# Patient Record
Sex: MALE | Race: WHITE | NOT HISPANIC OR LATINO | Employment: FULL TIME | URBAN - METROPOLITAN AREA
[De-identification: names, ages, dates, MRNs, and addresses within clinical notes are randomized per-mention and may not be internally consistent; named-entity substitution may affect disease eponyms.]

---

## 2017-08-09 ENCOUNTER — OFFICE VISIT (OUTPATIENT)
Dept: URGENT CARE | Facility: CLINIC | Age: 52
End: 2017-08-09
Payer: COMMERCIAL

## 2017-08-09 VITALS
HEART RATE: 78 BPM | DIASTOLIC BLOOD PRESSURE: 85 MMHG | HEIGHT: 77 IN | SYSTOLIC BLOOD PRESSURE: 140 MMHG | RESPIRATION RATE: 20 BRPM | BODY MASS INDEX: 26.8 KG/M2 | TEMPERATURE: 98 F | WEIGHT: 227 LBS | OXYGEN SATURATION: 96 %

## 2017-08-09 DIAGNOSIS — L30.9 FACIAL DERMATITIS: ICD-10-CM

## 2017-08-09 DIAGNOSIS — B37.2 CANDIDAL INTERTRIGO: Primary | ICD-10-CM

## 2017-08-09 PROCEDURE — 99204 OFFICE O/P NEW MOD 45 MIN: CPT | Performed by: PHYSICIAN ASSISTANT

## 2017-08-09 RX ORDER — CLOTRIMAZOLE 1 %
CREAM (GRAM) TOPICAL
Qty: 2 TUBE | Refills: 2 | Status: SHIPPED | OUTPATIENT
Start: 2017-08-09

## 2017-08-09 RX ORDER — IBUPROFEN 200 MG
200 TABLET ORAL EVERY 6 HOURS PRN
COMMUNITY

## 2017-08-09 RX ORDER — METHYLPREDNISOLONE 4 MG/1
TABLET ORAL
Qty: 21 TAB | Refills: 0 | Status: SHIPPED | OUTPATIENT
Start: 2017-08-09

## 2017-08-09 RX ORDER — OMEPRAZOLE 20 MG/1
20 CAPSULE, DELAYED RELEASE ORAL DAILY
COMMUNITY

## 2017-08-09 RX ORDER — TADALAFIL 2.5 MG/1
TABLET ORAL
COMMUNITY

## 2017-08-09 NOTE — MR AVS SNAPSHOT
"        Onel Thomas   2017 5:30 PM   Office Visit   MRN: [id number]    Department:  [facility]   Dept Phone:  [phone]    Description:  Male : 1965   Provider:  Henry Yang PA-C           Reason for Visit     Rash Couple days face itching .      Allergies as of 2017     No Known Allergies      You were diagnosed with     Candidal intertrigo   [270867]  -  Primary     Facial dermatitis   [834211]         Vital Signs     Blood Pressure Pulse Temperature Respirations Height Weight    140/85 mmHg 78 36.7 °C (98 °F) 20 1.956 m (6' 5.01\") 102.967 kg (227 lb)    Body Mass Index Oxygen Saturation Smoking Status             26.91 kg/m2 96% Never Smoker          Basic Information     Date Of Birth Sex Race Ethnicity Preferred Language    1965 Male White Non- English      Health Maintenance     Patient has no pending health maintenance at this time      Current Immunizations     No immunizations on file.      Below and/or attached are the medications your provider expects you to take. Review all of your home medications and newly ordered medications with your provider and/or pharmacist. Follow medication instructions as directed by your provider and/or pharmacist. Please keep your medication list with you and share with your provider. Update the information when medications are discontinued, doses are changed, or new medications (including over-the-counter products) are added; and carry medication information at all times in the event of emergency situations     Allergies:  No Known Allergies          Medications  Valid as of: 2017 -  5:50 PM    Generic Name Brand Name Tablet Size Instructions for use    Clotrimazole (Cream) LOTRIMIN 1 % Apply to affected area BID x 14 days        Ibuprofen (Tab) MOTRIN 200 MG Take 200 mg by mouth every 6 hours as needed.        Loratadine-Pseudoephedrine (TABLET SR 12 HR) CLARITIN D 5-120 MG Take 1 Tab by mouth 2 times a day.       " MethylPREDNISolone (Tablet Therapy Pack) MEDROL DOSEPAK 4 MG Use as directed        Omeprazole (CAPSULE DELAYED RELEASE) PRILOSEC 20 MG Take 20 mg by mouth every day.        Tadalafil (Tab) Tadalafil 2.5 MG Take  by mouth.        .                 Medicines prescribed today were sent to:     [id number] - [place], [place] - [address]    [address] [place] [address]    Phone: [phone] Fax: [phone]    Open 24 Hours?: No      Medication refill instructions:       If your prescription bottle indicates you have medication refills left, it is not necessary to call your provider’s office. Please contact your pharmacy and they will refill your medication.    If your prescription bottle indicates you do not have any refills left, you may request refills at any time through one of the following ways: The online [employer] system (except Urgent Care), by calling your provider’s office, or by asking your pharmacy to contact your provider’s office with a refill request. Medication refills are processed only during regular business hours and may not be available until the next business day. Your provider may request additional information or to have a follow-up visit with you prior to refilling your medication.   *Please Note: Medication refills are assigned a new Rx number when refilled electronically. Your pharmacy may indicate that no refills were authorized even though a new prescription for the same medication is available at the pharmacy. Please request the medicine by name with the pharmacy before contacting your provider for a refill.        Instructions    Intertrigo  Intertrigo is a skin condition that occurs in between folds of skin in places on the body that rub together a lot and do not get much ventilation. It is caused by heat, moisture, friction, sweat retention, and lack of air circulation, which produces red, irritated patches and, sometimes, scaling or drainage. People who have diabetes,  who are obese, or who have treatment with antibiotics are at increased risk for intertrigo.  The most common sites for intertrigo to occur include:  · The groin.  · The breasts.  · The armpits.  · Folds of abdominal skin.  · Webbed spaces between the fingers or toes.  Intertrigo may be aggravated by:  · Sweat.  · Feces.  · Yeast or bacteria that are present near skin folds.  · Urine.  · Vaginal discharge.  HOME CARE INSTRUCTIONS  · The following steps can be taken to reduce friction and keep the affected area cool and dry:  ¨ Expose skin folds to the air.  ¨ Keep deep skin folds  with cotton or linen cloth. Avoid tight fitting clothing that could cause chafing.  ¨ Wear open-toed shoes or sandals to help reduce moisture between the toes.  ¨ Apply absorbent powders to affected areas as directed by your caregiver.  ¨ Apply over-the-counter barrier pastes, such as zinc oxide, as directed by your caregiver.  · If you develop a fungal infection in the affected area, your caregiver may have you use antifungal creams.  SEEK MEDICAL CARE IF:   · The rash is not improving after 1 week of treatment.  · The rash is getting worse (more red, more swollen, more painful, or spreading).  · You have a fever or chills.  MAKE SURE YOU:   · Understand these instructions.  · Will watch your condition.  · Will get help right away if you are not doing well or get worse.     This information is not intended to replace advice given to you by your health care provider. Make sure you discuss any questions you have with your health care provider.     Document Released: 12/18/2006 Document Revised: 03/11/2013 Document Reviewed: 06/02/2011  [employer] Interactive Patient Education ©2016 Elsevier Inc.            [employer] Access Code: [email or web address]  Expires: 9/8/2017  5:50 PM    Your email address is not on file at [facility].  Email Addresses are required for you to sign up for [employer], please contact [phone] to verify your  personal information and to provide your email address prior to attempting to register for [employer].    Onel Thomas  [address] [place] [id number]    [employer]  A secure, online tool to manage your health information     [employer]’s [employer]® is a secure, online tool that connects you to your personalized health information from the privacy of your home -- day or night - making it very easy for you to manage your healthcare. Once the activation process is completed, you can even access your medical information using the [employer] rachel, which is available for free in the Apple Rachel store or Google Play store.     To learn more about [employer], visit [email or web address]    There are two levels of access available (as shown below):   My Chart Features  [facility] Primary Care Doctor [facility]  Specialists [facility]  Urgent  Care Non-[facility] Primary Care Doctor   Email your healthcare team securely and privately 24/7 X X X    Manage appointments: schedule your next appointment; view details of past/upcoming appointments X      Request prescription refills. X      View recent personal medical records, including lab and immunizations X X X X   View health record, including health history, allergies, medications X X X X   Read reports about your outpatient visits, procedures, consult and ER notes X X X X   See your discharge summary, which is a recap of your hospital and/or ER visit that includes your diagnosis, lab results, and care plan X X  X     How to register for [employer]:  Once your e-mail address has been verified, follow the following steps to sign up for [employer].     1. Go to  [email or web address]  2. Click on the Sign Up Now box, which takes you to the New Member Sign Up page. You will need to provide the following information:  a. Enter your [employer] Access Code exactly as it appears at the top of this page. (You will not need to use this code after you’ve completed the sign-up process. If you do not sign up  before the expiration date, you must request a new code.)   b. Enter your date of birth.   c. Enter your home email address.   d. Click Submit, and follow the next screen’s instructions.  3. Create a [employer] ID. This will be your [employer] login ID and cannot be changed, so think of one that is secure and easy to remember.  4. Create a [employer] password. You can change your password at any time.  5. Enter your Password Reset Question and Answer. This can be used at a later time if you forget your password.   6. Enter your e-mail address. This allows you to receive e-mail notifications when new information is available in [employer].  7. Click Sign Up. You can now view your health information.    For assistance activating your [employer] account, call [phone]

## 2017-08-10 NOTE — PROGRESS NOTES
Subjective:      Pt is a 52 y.o. male who presents with Rash            Rash  This is a new problem. The current episode started in the past 7 days. The problem has been gradually worsening since onset. The affected locations include the groin, face and left axilla. The rash is characterized by redness and itchiness. It is unknown if there was an exposure to a precipitant. Past treatments include anti-itch cream. The treatment provided no relief.   Pt states that he is from [place], out here to clean out his recent mother in law's home after she passed away. He notes a hoarder like situation with dust and dirt and grime and notes irritant rash on face with fungal like rash under left axilla and on groin. Pt has not taken any Rx medications for this condition. Pt states the pain is a 3/10 from itching, aching in nature and worse at night. Pt denies CP, SOB, NVD, paresthesias, headaches, dizziness, change in vision, hives, or other joint pain. The pt's medication list, problem list, and allergies have been evaluated and reviewed during today's visit.    PMH:  History reviewed. No pertinent past medical history.    PSH:  History reviewed. No pertinent past surgical history.    Fam Hx:  the patient's family history is not pertinent to their current complaint        Soc HX:  Social History     Social History   • Marital Status:      Spouse Name: N/A   • Number of Children: N/A   • Years of Education: N/A     Occupational History   • Not on file.     Social History Main Topics   • Smoking status: Never Smoker    • Smokeless tobacco: Never Used   • Alcohol Use: Not on file   • Drug Use: Not on file   • Sexual Activity: Not on file     Other Topics Concern   • Not on file     Social History Narrative   • No narrative on file         Medications:    Current outpatient prescriptions:   •  loratadine/pseudo SR (CLARITIN D) 5-120 MG TABLET SR 12 HR, Take 1 Tab by mouth 2 times a day., Disp: , Rfl:   •  Tadalafil  "(CIALIS) 2.5 MG Tab, Take  by mouth., Disp: , Rfl:   •  omeprazole (PRILOSEC) 20 MG delayed-release capsule, Take 20 mg by mouth every day., Disp: , Rfl:   •  ibuprofen (MOTRIN) 200 MG Tab, Take 200 mg by mouth every 6 hours as needed., Disp: , Rfl:   •  clotrimazole (LOTRIMIN) 1 % Cream, Apply to affected area BID x 14 days, Disp: 2 Tube, Rfl: 2  •  MethylPREDNISolone (MEDROL DOSEPAK) 4 MG Tablet Therapy Pack, Use as directed, Disp: 21 Tab, Rfl: 0      Allergies:  Review of patient's allergies indicates no known allergies.        Review of Systems   Skin: Positive for rash.   Constitutional: Negative for fever, chills and malaise/fatigue.   HENT: Negative for congestion and sore throat.    Eyes: Negative for blurred vision, double vision and photophobia.   Respiratory: Negative for cough and shortness of breath.    Cardiovascular: Negative for chest pain and palpitations.   Gastrointestinal: Negative for heartburn, nausea, vomiting, abdominal pain, diarrhea and constipation.   Genitourinary: Negative for dysuria and flank pain.   Musculoskeletal: Negative for joint pain and myalgias.   Neurological: Negative for dizziness, tingling and headaches.   Endo/Heme/Allergies: Does not bruise/bleed easily.   Psychiatric/Behavioral: Negative for depression. The patient is not nervous/anxious.             Objective:     /85 mmHg  Pulse 78  Temp(Src) 36.7 °C (98 °F)  Resp 20  Ht 1.956 m (6' 5.01\")  Wt 102.967 kg (227 lb)  BMI 26.91 kg/m2  SpO2 96%     Physical Exam   Skin: Skin is warm. Rash noted. No abrasion, no bruising, no burn, no ecchymosis, no laceration, no lesion and no petechiae noted. Rash is maculopapular. He is not diaphoretic. No cyanosis or erythema. No pallor. Nails show no clubbing.               Constitutional: PT is oriented to person, place, and time. PT appears well-developed and well-nourished. No distress.   HENT:   Head: Normocephalic and atraumatic.   Mouth/Throat: Oropharynx is clear and " moist. No oropharyngeal exudate.   Eyes: Conjunctivae normal and EOM are normal. Pupils are equal, round, and reactive to light.   Neck: Normal range of motion. Neck supple. No thyromegaly present.   Cardiovascular: Normal rate, regular rhythm, normal heart sounds and intact distal pulses.  Exam reveals no gallop and no friction rub.    No murmur heard.  Pulmonary/Chest: Effort normal and breath sounds normal. No respiratory distress. PT has no wheezes. PT has no rales. Pt exhibits no tenderness.   Abdominal: Soft. Bowel sounds are normal. PT exhibits no distension and no mass. There is no tenderness. There is no rebound and no guarding.   Musculoskeletal: Normal range of motion. PT exhibits no edema and no tenderness.   Neurological: PT is alert and oriented to person, place, and time. PT has normal reflexes. No cranial nerve deficit.       Psychiatric: PT has a normal mood and affect. PT behavior is normal. Judgment and thought content normal.        Assessment/Plan:     1. Candidal intertrigo    - clotrimazole (LOTRIMIN) 1 % Cream; Apply to affected area BID x 14 days  Dispense: 2 Tube; Refill: 2    2. Facial dermatitis    - MethylPREDNISolone (MEDROL DOSEPAK) 4 MG Tablet Therapy Pack; Use as directed  Dispense: 21 Tab; Refill: 0    Avoidance of triggers discussed  Rest, fluids encouraged.  AVS with medical info given.  Pt was in full understanding and agreement with the plan.  Follow-up as needed if symptoms worsen or fail to improve.

## 2017-08-10 NOTE — PATIENT INSTRUCTIONS
Intertrigo  Intertrigo is a skin condition that occurs in between folds of skin in places on the body that rub together a lot and do not get much ventilation. It is caused by heat, moisture, friction, sweat retention, and lack of air circulation, which produces red, irritated patches and, sometimes, scaling or drainage. People who have diabetes, who are obese, or who have treatment with antibiotics are at increased risk for intertrigo.  The most common sites for intertrigo to occur include:  · The groin.  · The breasts.  · The armpits.  · Folds of abdominal skin.  · Webbed spaces between the fingers or toes.  Intertrigo may be aggravated by:  · Sweat.  · Feces.  · Yeast or bacteria that are present near skin folds.  · Urine.  · Vaginal discharge.  HOME CARE INSTRUCTIONS  · The following steps can be taken to reduce friction and keep the affected area cool and dry:  ¨ Expose skin folds to the air.  ¨ Keep deep skin folds  with cotton or linen cloth. Avoid tight fitting clothing that could cause chafing.  ¨ Wear open-toed shoes or sandals to help reduce moisture between the toes.  ¨ Apply absorbent powders to affected areas as directed by your caregiver.  ¨ Apply over-the-counter barrier pastes, such as zinc oxide, as directed by your caregiver.  · If you develop a fungal infection in the affected area, your caregiver may have you use antifungal creams.  SEEK MEDICAL CARE IF:   · The rash is not improving after 1 week of treatment.  · The rash is getting worse (more red, more swollen, more painful, or spreading).  · You have a fever or chills.  MAKE SURE YOU:   · Understand these instructions.  · Will watch your condition.  · Will get help right away if you are not doing well or get worse.     This information is not intended to replace advice given to you by your health care provider. Make sure you discuss any questions you have with your health care provider.     Document Released: 12/18/2006 Document  Revised: 03/11/2013 Document Reviewed: 06/02/2011  Elsevier Interactive Patient Education ©2016 Elsevier Inc.

## 2017-08-11 ENCOUNTER — TELEPHONE (OUTPATIENT)
Dept: URGENT CARE | Facility: CLINIC | Age: 52
End: 2017-08-11

## 2017-08-11 NOTE — TELEPHONE ENCOUNTER
Called pt regarding tinea cream and he requested advice on OTC cream of the same nature. I discussed that with him and he was thankful for the phone call.  Henry Yang PA-C